# Patient Record
Sex: MALE | Race: WHITE
[De-identification: names, ages, dates, MRNs, and addresses within clinical notes are randomized per-mention and may not be internally consistent; named-entity substitution may affect disease eponyms.]

---

## 2017-02-27 ENCOUNTER — HOSPITAL ENCOUNTER (OUTPATIENT)
Dept: HOSPITAL 84 - D.OPS | Age: 57
Discharge: HOME | End: 2017-02-27
Attending: INTERNAL MEDICINE
Payer: COMMERCIAL

## 2017-02-27 VITALS — SYSTOLIC BLOOD PRESSURE: 110 MMHG | DIASTOLIC BLOOD PRESSURE: 58 MMHG

## 2017-02-27 VITALS — WEIGHT: 225.47 LBS | BODY MASS INDEX: 31.56 KG/M2 | BODY MASS INDEX: 31.56 KG/M2 | HEIGHT: 71 IN

## 2017-02-27 DIAGNOSIS — Z12.11: Primary | ICD-10-CM

## 2017-02-27 DIAGNOSIS — K64.8: ICD-10-CM

## 2017-02-27 DIAGNOSIS — Z86.010: ICD-10-CM

## 2017-02-27 LAB
ANION GAP SERPL CALC-SCNC: 11.6 MMOL/L (ref 8–16)
BASOPHILS NFR BLD AUTO: 0.3 % (ref 0–2)
BUN SERPL-MCNC: 14 MG/DL (ref 7–18)
CALCIUM SERPL-MCNC: 9.3 MG/DL (ref 8.5–10.1)
CHLORIDE SERPL-SCNC: 103 MMOL/L (ref 98–107)
CO2 SERPL-SCNC: 28.7 MMOL/L (ref 21–32)
CREAT SERPL-MCNC: 1.2 MG/DL (ref 0.6–1.3)
EOSINOPHIL NFR BLD: 2.7 % (ref 0–7)
ERYTHROCYTE [DISTWIDTH] IN BLOOD BY AUTOMATED COUNT: 14.8 % (ref 11.5–14.5)
GLUCOSE SERPL-MCNC: 102 MG/DL (ref 74–106)
HCT VFR BLD CALC: 51.9 % (ref 42–54)
HGB BLD-MCNC: 17.4 G/DL (ref 13.5–17.5)
IMM GRANULOCYTES NFR BLD: 0.2 % (ref 0–5)
LYMPHOCYTES NFR BLD AUTO: 22.8 % (ref 15–50)
MCH RBC QN AUTO: 28.8 PG (ref 26–34)
MCHC RBC AUTO-ENTMCNC: 33.5 G/DL (ref 31–37)
MCV RBC: 85.8 FL (ref 80–100)
MONOCYTES NFR BLD: 7.8 % (ref 2–11)
NEUTROPHILS NFR BLD AUTO: 66.2 % (ref 40–80)
OSMOLALITY SERPL CALC.SUM OF ELEC: 278 MOSM/KG (ref 275–300)
PLATELET # BLD: 189 10X3/UL (ref 130–400)
PMV BLD AUTO: 10 FL (ref 7.4–10.4)
POTASSIUM SERPL-SCNC: 4.3 MMOL/L (ref 3.5–5.1)
RBC # BLD AUTO: 6.05 10X6/UL (ref 4.2–6.1)
SODIUM SERPL-SCNC: 139 MMOL/L (ref 136–145)
WBC # BLD AUTO: 6.3 10X3/UL (ref 4.8–10.8)

## 2017-03-13 NOTE — OP
PATIENT NAME:  MAINOR GARCIA                             MEDICAL RECORD: [id number]
:64                                             LOCATION:D.OPS          
                                                         ADMISSION DATE:        
SURGEON:  YAKOV WEI MD               
 
 
DATE OF OPERATION:  2017
 
PROCEDURE:  Colonoscopy.
 
PREOPERATIVE NOTE:  Mr. Gracia is a pleasant 56-year-old white gentleman who has
a personal history of colon polyps.  His last colonoscopy was in .  He
presents today for surveillance procedure.
 
OPERATIVE NOTE:  The procedure, risks, and complications were explained to the
patient and consent was obtained.  The patient was premedicated, sedated and a
colonoscopy was performed.  The colonoscope was advanced to the cecum with ease.
 The prep was good.  The patient had no polyps, masses or diverticula throughout
the colon.  There is no erythema noted.  Retroflexion showed minimal internal
hemorrhoids.  He tolerated the procedure well without immediate complication.
 
IMPRESSION:  Minimal internal hemorrhoids, otherwise normal colonoscopy.
 
RECOMMENDATIONS:  Repeat colonoscopy in 5-10 years.
 
TRANSINT:[id number] Voice Confirmation ID: [id number] DOCUMENT ID: [id number]
                                           
                                           YAKOV WEI MD               
 
 
 
Electronically Signed by YAKOV WEI on 17 at 1531
 
 
 
 
 
 
 
 
 
 
 
 
 
 
 
 
 
 
CC: EARNEST ARELLANO MD                                         [id number]
DICTATION DATE: 17 1018     :     17 1050      [id number] [place] [address]

## 2017-10-13 ENCOUNTER — HOSPITAL ENCOUNTER (EMERGENCY)
Dept: HOSPITAL 84 - D.ER | Age: 57
Discharge: HOME | End: 2017-10-13
Payer: COMMERCIAL

## 2017-10-13 VITALS — BODY MASS INDEX: 31.4 KG/M2
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
## 2017-10-13 DIAGNOSIS — I10: ICD-10-CM
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
## 2017-10-13 DIAGNOSIS — T67.2XXA: Primary | ICD-10-CM

## 2017-10-13 DIAGNOSIS — E86.0: ICD-10-CM
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
## 2017-10-13 LAB
ALBUMIN SERPL-MCNC: 3.9 G/DL (ref 3.4–5)
ALP SERPL-CCNC: 71 U/L (ref 46–116)
ALT SERPL-CCNC: 46 U/L (ref 10–68)
ANION GAP SERPL CALC-SCNC: 13.9 MMOL/L (ref 8–16)
APPEARANCE UR: CLEAR
BACTERIA #/AREA URNS HPF: (no result) /HPF
BASOPHILS NFR BLD AUTO: 0.2 % (ref 0–2)
BILIRUB SERPL-MCNC: 1.07 MG/DL (ref 0.2–1.3)
BILIRUB SERPL-MCNC: NEGATIVE MG/DL
BUN SERPL-MCNC: 27 MG/DL (ref 7–18)
CALCIUM SERPL-MCNC: 10 MG/DL (ref 8.5–10.1)
CHLORIDE SERPL-SCNC: 98 MMOL/L (ref 98–107)
CO2 SERPL-SCNC: 26.5 MMOL/L (ref 21–32)
COLOR UR: YELLOW
CREAT SERPL-MCNC: 1.6 MG/DL (ref 0.6–1.3)
EOSINOPHIL NFR BLD: 1.9 % (ref 0–7)
ERYTHROCYTE [DISTWIDTH] IN BLOOD BY AUTOMATED COUNT: 13.8 % (ref 11.5–14.5)
GLOBULIN SER-MCNC: 3.2 G/L
GLUCOSE SERPL-MCNC: 73 MG/DL (ref 74–106)
GLUCOSE SERPL-MCNC: NEGATIVE MG/DL
HCT VFR BLD CALC: 42.1 % (ref 42–54)
HGB BLD-MCNC: 14.7 G/DL (ref 13.5–17.5)
IMM GRANULOCYTES NFR BLD: 0.1 % (ref 0–5)
KETONES UR STRIP-MCNC: NEGATIVE MG/DL
LYMPHOCYTES NFR BLD AUTO: 14.7 % (ref 15–50)
MCH RBC QN AUTO: 31.1 PG (ref 26–34)
MCHC RBC AUTO-ENTMCNC: 34.9 G/DL (ref 31–37)
MCV RBC: 89.2 FL (ref 80–100)
MONOCYTES NFR BLD: 8.6 % (ref 2–11)
NEUTROPHILS NFR BLD AUTO: 74.5 % (ref 40–80)
NITRITE UR-MCNC: NEGATIVE MG/ML
OSMOLALITY SERPL CALC.SUM OF ELEC: 273 MOSM/KG (ref 275–300)
PH UR STRIP: 5 [PH] (ref 5–6)
PLATELET # BLD: 187 10X3/UL (ref 130–400)
PMV BLD AUTO: 9.7 FL (ref 7.4–10.4)
POTASSIUM SERPL-SCNC: 3.4 MMOL/L (ref 3.5–5.1)
PROT SERPL-MCNC: 7.1 G/DL (ref 6.4–8.2)
PROT UR-MCNC: NEGATIVE MG/DL
RBC # BLD AUTO: 4.72 10X6/UL (ref 4.2–6.1)
SODIUM SERPL-SCNC: 135 MMOL/L (ref 136–145)
SP GR UR STRIP: 1.02 (ref 1–1.02)
UROBILINOGEN UR-MCNC: NORMAL MG/DL
WBC # BLD AUTO: 8.2 10X3/UL (ref 4.8–10.8)
WBC #/AREA URNS HPF: (no result) /HPF (ref 0–5)

## 2017-10-18 ENCOUNTER — HOSPITAL ENCOUNTER (OUTPATIENT)
Dept: HOSPITAL 84 - D.MRI | Age: 57
Discharge: HOME | End: 2017-10-18
Attending: ORTHOPAEDIC SURGERY
Payer: COMMERCIAL

## 2017-10-18 VITALS — BODY MASS INDEX: 31.4 KG/M2

## 2017-10-18 DIAGNOSIS — S83.221A: Primary | ICD-10-CM

## 2017-12-04 ENCOUNTER — HOSPITAL ENCOUNTER (OUTPATIENT)
Dept: HOSPITAL 84 - D.OPS | Age: 57
Discharge: HOME | End: 2017-12-04
Attending: ORTHOPAEDIC SURGERY
Payer: COMMERCIAL

## 2017-12-04 VITALS — DIASTOLIC BLOOD PRESSURE: 76 MMHG | SYSTOLIC BLOOD PRESSURE: 123 MMHG

## 2017-12-04 VITALS — BODY MASS INDEX: 33.6 KG/M2 | BODY MASS INDEX: 33.6 KG/M2 | HEIGHT: 71 IN | WEIGHT: 240 LBS

## 2017-12-04 DIAGNOSIS — K21.9: ICD-10-CM

## 2017-12-04 DIAGNOSIS — S83.281A: ICD-10-CM

## 2017-12-04 DIAGNOSIS — S83.221A: Primary | ICD-10-CM

## 2017-12-04 DIAGNOSIS — I10: ICD-10-CM

## 2017-12-04 DIAGNOSIS — Z01.812: ICD-10-CM

## 2017-12-04 LAB
ANION GAP SERPL CALC-SCNC: 10.1 MMOL/L (ref 8–16)
BUN SERPL-MCNC: 16 MG/DL (ref 7–18)
CALCIUM SERPL-MCNC: 8.9 MG/DL (ref 8.5–10.1)
CHLORIDE SERPL-SCNC: 105 MMOL/L (ref 98–107)
CO2 SERPL-SCNC: 29.1 MMOL/L (ref 21–32)
CREAT SERPL-MCNC: 1.2 MG/DL (ref 0.6–1.3)
ERYTHROCYTE [DISTWIDTH] IN BLOOD BY AUTOMATED COUNT: 14.3 % (ref 11.5–14.5)
GLUCOSE SERPL-MCNC: 108 MG/DL (ref 74–106)
HCT VFR BLD CALC: 46.5 % (ref 42–54)
HGB BLD-MCNC: 15.6 G/DL (ref 13.5–17.5)
MCH RBC QN AUTO: 30 PG (ref 26–34)
MCHC RBC AUTO-ENTMCNC: 33.5 G/DL (ref 31–37)
MCV RBC: 89.4 FL (ref 80–100)
OSMOLALITY SERPL CALC.SUM OF ELEC: 280 MOSM/KG (ref 275–300)
PLATELET # BLD: 182 10X3/UL (ref 130–400)
PMV BLD AUTO: 9.8 FL (ref 7.4–10.4)
POTASSIUM SERPL-SCNC: 4.2 MMOL/L (ref 3.5–5.1)
RBC # BLD AUTO: 5.2 10X6/UL (ref 4.2–6.1)
SODIUM SERPL-SCNC: 140 MMOL/L (ref 136–145)
WBC # BLD AUTO: 5.3 10X3/UL (ref 4.8–10.8)

## 2017-12-04 NOTE — OP
PATIENT NAME:  MAINOR GARCIA                             MEDICAL RECORD: [id number]
:64                                             LOCATION:D.OPS          
                                                         ADMISSION DATE:        
SURGEON:  EARNEST BERMUDEZ MD        
 
 
DATE OF OPERATION:  2017
 
PREOPERATIVE DIAGNOSIS:  Medial meniscus tear of the right knee.
 
POSTOPERATIVE DIAGNOSES:  Medial meniscus tear of the right knee plus lateral
meniscus tear.
 
PROCEDURES:
1.  Arthroscopic partial medial meniscectomy.
2.  Arthroscopic partial lateral meniscectomy.
 
SURGEON:  Earnest Bermudez MD
 
ANESTHESIA:  General.
 
INTRAOPERATIVE COMPLICATIONS:  None.
 
SUMMARY OF PATHOLOGIC FINDINGS:  The patient did have some articular surface
chondromalacia grade II on the most medial aspect of the medial femoral condyle
where the menisci had been rubbing obviously and a radial beak tear on the
lateral meniscus was also noted.
 
OPERATIVE SUMMARY IN DETAIL:  After obtaining the appropriate preoperative
orthopedic surgery consent as well as anesthetic consultation, evaluation, and
clearance, the patient was brought to the operating room and placed on the
operating table in supine position.  After general laryngeal mask airway was
administered, tourniquet was placed about the proximal aspect of the right lower
extremity.  Right lower extremity was then prepped and draped in routine sterile
fashion.  The leg was elevated and exsanguinated, tourniquet inflated to 350
mmHg.  A routine inferolateral portal was established followed by superior
medial portal and inferomedial portal.  Diagnostic arthroscopy revealed the
above findings.  Attention was first turned to complete debridement of the
meniscal tear.  The meniscus was debrided back to stable meniscal elements.  The
knee was then placed in a figure-of-four.  The small radial oblique tear was
also seen and taken down with a combination of meniscotome and a full radius
resector.  Having completed this, the knee was insufflated with 30 cc of 0.25%
Marcaine with epinephrine and 80 mg of Depo-Medrol.  Arthroscopy portals were
closed in routine interrupted fashion using 4-0 Prolene.  Sterile dressings were
applied.  The patient was awakened and taken to the recovery room in stable
condition.  All final needle, instrument, and sponge counts were correct.
 
TRANSINT:[id number] Voice Confirmation ID: [id number] DOCUMENT ID: [id number]
                                           
                                           EARNEST BERMUDEZ MD        
 
 
 
Electronically Signed by EARNEST BERMUDEZ MD on 17 at 1644
CC:                                                             [id number]
DICTATION DATE: 17 1004     :     17 1135      REG [facility]                                          
 [place] [id number]

## 2017-12-18 ENCOUNTER — HOSPITAL ENCOUNTER (OUTPATIENT)
Dept: HOSPITAL 84 - D.OPS | Age: 57
Discharge: HOME | End: 2017-12-18
Attending: ORTHOPAEDIC SURGERY
Payer: COMMERCIAL

## 2017-12-18 VITALS — SYSTOLIC BLOOD PRESSURE: 123 MMHG | DIASTOLIC BLOOD PRESSURE: 87 MMHG

## 2017-12-18 VITALS — BODY MASS INDEX: 34.3 KG/M2 | HEIGHT: 71 IN | WEIGHT: 245 LBS | BODY MASS INDEX: 34.3 KG/M2

## 2017-12-18 DIAGNOSIS — K21.9: ICD-10-CM

## 2017-12-18 DIAGNOSIS — S62.304A: Primary | ICD-10-CM

## 2017-12-18 DIAGNOSIS — Z01.812: ICD-10-CM

## 2017-12-18 DIAGNOSIS — M25.561: ICD-10-CM

## 2017-12-18 DIAGNOSIS — I10: ICD-10-CM

## 2017-12-18 LAB
ANION GAP SERPL CALC-SCNC: 12.3 MMOL/L (ref 8–16)
BUN SERPL-MCNC: 18 MG/DL (ref 7–18)
CALCIUM SERPL-MCNC: 9.2 MG/DL (ref 8.5–10.1)
CHLORIDE SERPL-SCNC: 105 MMOL/L (ref 98–107)
CO2 SERPL-SCNC: 25.9 MMOL/L (ref 21–32)
CREAT SERPL-MCNC: 1.1 MG/DL (ref 0.6–1.3)
ERYTHROCYTE [DISTWIDTH] IN BLOOD BY AUTOMATED COUNT: 14.1 % (ref 11.5–14.5)
GLUCOSE SERPL-MCNC: 98 MG/DL (ref 74–106)
HCT VFR BLD CALC: 49.2 % (ref 42–54)
HGB BLD-MCNC: 16.7 G/DL (ref 13.5–17.5)
MCH RBC QN AUTO: 30.4 PG (ref 26–34)
MCHC RBC AUTO-ENTMCNC: 33.9 G/DL (ref 31–37)
MCV RBC: 89.6 FL (ref 80–100)
OSMOLALITY SERPL CALC.SUM OF ELEC: 279 MOSM/KG (ref 275–300)
PLATELET # BLD: 168 10X3/UL (ref 130–400)
PMV BLD AUTO: 10.2 FL (ref 7.4–10.4)
POTASSIUM SERPL-SCNC: 4.2 MMOL/L (ref 3.5–5.1)
RBC # BLD AUTO: 5.49 10X6/UL (ref 4.2–6.1)
SODIUM SERPL-SCNC: 139 MMOL/L (ref 136–145)
WBC # BLD AUTO: 9.2 10X3/UL (ref 4.8–10.8)

## 2017-12-18 NOTE — OP
PATIENT NAME:  MAINOR GARCIA                             MEDICAL RECORD: [id number]
:64                                             LOCATION:D.OPS          
                                                         ADMISSION DATE:        
SURGEON:  EARNEST BERMUDEZ MD        
 
 
DATE OF OPERATION:  2017
 
DATE OF OPERATION:  2017
 
PREOPERATIVE DIAGNOSIS:  Comminuted fracture of the right fourth metacarpal.
 
POSTOPERATIVE DIAGNOSIS:  Comminuted fracture of the right fourth metacarpal.
 
PROCEDURE:  Open reduction internal fixation of right fourth metacarpal.
 
SURGEON:  Earnest Bermudez MD
 
ANESTHESIA:  General.
 
INTRAOPERATIVE COMPLICATIONS:  None.
 
SUMMARY OF PATHOLOGIC FINDINGS:  The patient did have a torque deformity with
crossover upon flexion.  Therefore, the decision was made to fix this
operatively instead of let it heal without surgery.
 
OPERATIVE SUMMARY IN DETAIL:  After obtaining the appropriate preoperative
orthopedic surgery consent as well as anesthetic consultation, evaluation and
clearance, the patient was brought to the operating room and placed on the
operating table in supine position.  After general endotracheal anesthesia was
administered, tourniquet was placed about the proximal aspect of the right upper
extremity.  Right upper extremity was then prepped and draped in routine sterile
fashion.  The arm was elevated and exsanguinated, tourniquet inflated to 250
mmHg.  Linear incision was made directly over the fourth metacarpal taken down
the extensor mechanism retracted.  The periosteum was split, divided, and a
subperiosteal dissection was taken down the fracture.  The fracture was reduced
and pinned provisionally with an 0.045 K-wire.  Then under fluoroscopic
guidance, a 2.3 VariAx plate with 8 holes was put in.  Serial and sequential
drill and fill was done with a combination of both compression and locking
screws under fluoroscopic guidance.  Final radiographs were taken and submitted
for radiologist review.  The wound was copiously irrigated and closed  with 2-0
Vicryl followed by 4-0 Prolene in running fashion.  The area was locally
infiltrated with 0.25% Marcaine plain.  Sterile dressings were applied. 
Tourniquet was deflated.  The patient was awakened and taken to the recovery
room in stable condition.  All final needle and sponge counts were correct.
 
TRANSINT:[id number] Voice Confirmation ID: [id number] DOCUMENT ID: [id number]
                                           
                                           EARNEST BERMUDEZ MD        
 
 
 
Electronically Signed by EARNEST BERMUDEZ MD on 17 at 8291
CC:                                                             [id number]
DICTATION DATE: 17 1919     :     17 1742      REG [facility]                                          
[address]                           
[place], [facility]

## 2018-01-10 ENCOUNTER — HOSPITAL ENCOUNTER (OUTPATIENT)
Dept: HOSPITAL 84 - D.OPS | Age: 58
Discharge: HOME | End: 2018-01-10
Attending: ORTHOPAEDIC SURGERY
Payer: COMMERCIAL

## 2018-01-10 VITALS — DIASTOLIC BLOOD PRESSURE: 67 MMHG | SYSTOLIC BLOOD PRESSURE: 121 MMHG

## 2018-01-10 VITALS — HEIGHT: 71 IN | BODY MASS INDEX: 33.6 KG/M2 | WEIGHT: 240 LBS | BODY MASS INDEX: 33.6 KG/M2

## 2018-01-10 DIAGNOSIS — K21.9: ICD-10-CM

## 2018-01-10 DIAGNOSIS — S62.314A: Primary | ICD-10-CM

## 2018-01-10 DIAGNOSIS — Z01.812: ICD-10-CM

## 2018-01-10 DIAGNOSIS — I10: ICD-10-CM

## 2018-01-10 LAB
ERYTHROCYTE [DISTWIDTH] IN BLOOD BY AUTOMATED COUNT: 14.4 % (ref 11.5–14.5)
HCT VFR BLD CALC: 48.7 % (ref 42–54)
HGB BLD-MCNC: 16.4 G/DL (ref 13.5–17.5)
MCH RBC QN AUTO: 30.4 PG (ref 26–34)
MCHC RBC AUTO-ENTMCNC: 33.7 G/DL (ref 31–37)
MCV RBC: 90.4 FL (ref 80–100)
PLATELET # BLD: 187 10X3/UL (ref 130–400)
PMV BLD AUTO: 9.6 FL (ref 7.4–10.4)
RBC # BLD AUTO: 5.39 10X6/UL (ref 4.2–6.1)
WBC # BLD AUTO: 5 10X3/UL (ref 4.8–10.8)

## 2019-04-29 ENCOUNTER — HOSPITAL ENCOUNTER (OUTPATIENT)
Dept: HOSPITAL 84 - D.MRI | Age: 59
Discharge: HOME | End: 2019-04-29
Attending: FAMILY MEDICINE
Payer: COMMERCIAL

## 2019-04-29 VITALS — BODY MASS INDEX: 33.5 KG/M2

## 2019-04-29 DIAGNOSIS — M54.5: Primary | ICD-10-CM
